# Patient Record
Sex: MALE | Race: WHITE | NOT HISPANIC OR LATINO
[De-identification: names, ages, dates, MRNs, and addresses within clinical notes are randomized per-mention and may not be internally consistent; named-entity substitution may affect disease eponyms.]

---

## 2023-05-11 ENCOUNTER — APPOINTMENT (OUTPATIENT)
Dept: OTOLARYNGOLOGY | Facility: CLINIC | Age: 48
End: 2023-05-11
Payer: COMMERCIAL

## 2023-05-11 DIAGNOSIS — Z78.9 OTHER SPECIFIED HEALTH STATUS: ICD-10-CM

## 2023-05-11 DIAGNOSIS — J34.2 DEVIATED NASAL SEPTUM: ICD-10-CM

## 2023-05-11 DIAGNOSIS — J34.89 OTHER SPECIFIED DISORDERS OF NOSE AND NASAL SINUSES: ICD-10-CM

## 2023-05-11 DIAGNOSIS — F17.200 NICOTINE DEPENDENCE, UNSPECIFIED, UNCOMPLICATED: ICD-10-CM

## 2023-05-11 PROCEDURE — 99204 OFFICE O/P NEW MOD 45 MIN: CPT | Mod: 25

## 2023-05-11 PROCEDURE — 31231 NASAL ENDOSCOPY DX: CPT

## 2023-05-11 RX ORDER — FLUTICASONE PROPIONATE 50 UG/1
50 SPRAY, METERED NASAL
Qty: 1 | Refills: 3 | Status: ACTIVE | COMMUNITY
Start: 2023-05-11 | End: 1900-01-01

## 2023-05-11 NOTE — HISTORY OF PRESENT ILLNESS
[de-identified] : 47 year old male presents for evaluation of nasal congestion for 10-15 years, worse at night. \par States Left nostril is better than right \par Symptoms: Reports PND and occasional throat pain \par States it is difficult to breath at night. \par Denies anterior rhinorrhea or facial pain and pressure\par Sense of smell is good \par Recurrent sinus infections: No \par Denies the use of nasal sprays, saline rinses and gels \par Patient denies dysphagia, odynophagia, dysphonia. \par Reports occasional ear discomfort for 2 days\par Patient denies otorrhea, ear infections, hearing loss, tinnitus, dizziness, vertigo, headaches related to hearing. \par No recent CT sinuses\par \par PMH: None \par PSH: Wrist and Hernia

## 2023-06-28 ENCOUNTER — APPOINTMENT (OUTPATIENT)
Dept: OTOLARYNGOLOGY | Facility: CLINIC | Age: 48
End: 2023-06-28